# Patient Record
Sex: MALE | Race: WHITE | NOT HISPANIC OR LATINO | URBAN - METROPOLITAN AREA
[De-identification: names, ages, dates, MRNs, and addresses within clinical notes are randomized per-mention and may not be internally consistent; named-entity substitution may affect disease eponyms.]

---

## 2020-09-22 ENCOUNTER — OFFICE VISIT (OUTPATIENT)
Dept: INTERNAL MEDICINE | Facility: CLINIC | Age: 35
End: 2020-09-22

## 2020-09-22 VITALS
DIASTOLIC BLOOD PRESSURE: 78 MMHG | BODY MASS INDEX: 32.05 KG/M2 | HEIGHT: 72 IN | HEART RATE: 84 BPM | OXYGEN SATURATION: 97 % | WEIGHT: 236.6 LBS | SYSTOLIC BLOOD PRESSURE: 114 MMHG

## 2020-09-22 DIAGNOSIS — Z11.1 TUBERCULOSIS SCREENING: ICD-10-CM

## 2020-09-22 DIAGNOSIS — Z00.00 ANNUAL PHYSICAL EXAM: Primary | ICD-10-CM

## 2020-09-22 DIAGNOSIS — Z23 NEEDS FLU SHOT: ICD-10-CM

## 2020-09-22 DIAGNOSIS — Z13.220 LIPID SCREENING: ICD-10-CM

## 2020-09-22 DIAGNOSIS — E66.9 OBESITY, CLASS I, BMI 30-34.9: ICD-10-CM

## 2020-09-22 PROCEDURE — 90471 IMMUNIZATION ADMIN: CPT | Performed by: FAMILY MEDICINE

## 2020-09-22 PROCEDURE — 99385 PREV VISIT NEW AGE 18-39: CPT | Performed by: FAMILY MEDICINE

## 2020-09-22 PROCEDURE — 90686 IIV4 VACC NO PRSV 0.5 ML IM: CPT | Performed by: FAMILY MEDICINE

## 2020-09-22 NOTE — PROGRESS NOTES
"Date of Encounter: 2020  Patient: Nakul Torres,  1985    Subjective   History of Presenting Illness  Chief complaint: Annual physical    No acute concerns.    Entering health leadership program at [employer].  Needs tuberculosis screening as part of his evaluation for the program.    Lives with wife and son.  Sexually active.  Prior very brief smoker.  3-4 alcoholic drinks per week.  Exercises 5 to 7 days/week using a exercise bike.  Has a dietitian to help make meals.  Works in commercial real estate finance.    Review of Systems:  Negative for fever, congestion, chest pain upon exertion, shortness of breath, vision changes, vomiting, dysuria, lymphadenopathy, muscle weakness, numbness, mood changes, rashes.    The following portions of the patient's history were reviewed and updated as appropriate: allergies, current medications, past family history, past medical history, past social history, past surgical history and problem list.    Patient Active Problem List   Diagnosis   • Obesity, Class I, BMI 30-34.9     History reviewed. No pertinent past medical history.  History reviewed. No pertinent surgical history.  Family History   Problem Relation Age of Onset   • Diabetes Mother        Current Outpatient Medications:   •  Multiple Vitamins-Minerals (MULTIVITAMIN ADULT PO), Take  by mouth., Disp: , Rfl:   No Known Allergies  Social History     Tobacco Use   • Smoking status: Former Smoker   • Smokeless tobacco: Never Used   Substance Use Topics   • Alcohol use: Yes   • Drug use: Never          Objective   Physical Exam  Vitals:    20 0838   BP: 114/78   Pulse: 84   SpO2: 97%   Weight: 107 kg (236 lb 9.6 oz)   Height: 182.9 cm (72\")     Body mass index is 32.09 kg/m².    Constitutional: NAD.  Eyes: EOMI. PERRLA. Normal conjunctiva.  Ear, nose, mouth, throat: No tonsillar exudates or erythema.   Normal nasal mucosa. Normal external ear canals and TMs bilaterally.  Cardiovascular: RRR. No " murmurs. No LE edema b/l. Radial pulses 2+ bilaterally.  Pulmonary: CTA b/l. Good effort.  Integumentary: No rashes or wounds on face or upper extremities.  Lymphatic: No anterior cervical lymphadenopathy.  Endocrine: No thyromegaly or palpable thyroid nodules.  Psychiatric: Normal affect. Normal thought content.     Assessment/Plan   Assessment and Plan  Pleasant 35-year-old male with BMI greater than 30 who presents for the following:    Diagnoses and all orders for this visit:    1. Annual physical exam (Primary): We discussed preventative care including age and patient-appropriate immunizations and cancer screening. We also discussed the importance of exercise and a healthy diet. This is their annual preventative exam.    2. Tuberculosis screening  -     QuantiFERON TB Gold    3. Lipid screening  -     Lipid Panel    4. Needs flu shot  -     FluLaval Quad >6 Months (1977-8516)    5. Obesity, Class I, BMI 30-34.9    Return to office in 1 year for annual physical or earlier as needed.    Nikita Guillen MD  Family Medicine  O: [phone]  C: [phone]    Disclaimer: Parts of this note were dictated by speech recognition. Minor errors in transcription may be present. Please call if questions.

## 2020-09-25 PROBLEM — E78.5 HYPERLIPIDEMIA: Status: ACTIVE | Noted: 2020-09-25

## 2020-09-25 LAB
CHOLEST SERPL-MCNC: 142 MG/DL (ref 100–199)
GAMMA INTERFERON BACKGROUND BLD IA-ACNC: 0.04 IU/ML
HDLC SERPL-MCNC: 19 MG/DL
LDLC SERPL CALC-MCNC: 103 MG/DL (ref 0–99)
M TB IFN-G BLD-IMP: NEGATIVE
M TB IFN-G CD4+ BCKGRND COR BLD-ACNC: 0.05 IU/ML
M TB IFN-G CD4+CD8+ BCKGRND COR BLD-ACNC: 0.05 IU/ML
MITOGEN IGNF BLD-ACNC: >10 IU/ML
QUANTIFERON INCUBATION: NORMAL
SERVICE CMNT-IMP: NORMAL
TRIGL SERPL-MCNC: 108 MG/DL (ref 0–149)
VLDLC SERPL CALC-MCNC: 20 MG/DL (ref 5–40)

## 2020-10-27 ENCOUNTER — TELEPHONE (OUTPATIENT)
Dept: INTERNAL MEDICINE | Facility: CLINIC | Age: 35
End: 2020-10-27

## 2020-10-27 NOTE — TELEPHONE ENCOUNTER
Patient called to see if we had records of his MMR vaccination prior to 11/21/2019.  As this was completed in [place], we do not.  Pt is wanting to come in for an additional vaccine.  Explained that we would need to see if he has had the full vaccine before administering any additional through blood work.  Patient needs this for school/college.  Please advise.    Pt call back [phone]

## 2020-10-28 NOTE — TELEPHONE ENCOUNTER
Let him know we have two options:  #1 he gets records of prior vaccination, and therefore we don't need to administer any more  #2 we get titers for MMR, and if they are positive he does not need any further vaccination    Regardless I think we will need to have him get the vaccine at a pharmacy when needed as we only have MMRV (proquad) I think?

## 2020-10-28 NOTE — TELEPHONE ENCOUNTER
S/w pt re: retrieving records from [place]. I advised pt to contact previous PCP and ask for records, specifically vaccination records. I also offered to have pt come in to our office to sign release form that I will then fax to the [place] office for records.     Pt states he would like to do both, contact [place] office and sign form for our office. Pt will come in to office tomorrow, 10/29/2020, to sign release form.    I also advised pt that if we are unable to confirm previous vaccination for MMR, we can have him come in for MMR titer instead to determine if he needs to be vaccinated at all. Pt agreed.    Side note: we do only have ProQuad, and I will advise pt if titers come back negative.

## 2020-11-13 ENCOUNTER — TELEPHONE (OUTPATIENT)
Dept: INTERNAL MEDICINE | Facility: CLINIC | Age: 35
End: 2020-11-13

## 2020-11-13 NOTE — TELEPHONE ENCOUNTER
Patient called in and stated he has requested medical records from [place] and wants to know were they are in receiving this and the process .         Please call him at [phone]

## 2020-11-16 NOTE — TELEPHONE ENCOUNTER
Spoke to patient in person and advised we have made several attempts to obtain records and have been unsuccessful.  Advised patient that he may call his previous pcp and request a copy as he is entitled to one free copy.  Pt advised he will do this.

## 2020-12-01 DIAGNOSIS — Z23 NEED FOR MEASLES-MUMPS-RUBELLA (MMR) VACCINE: Primary | ICD-10-CM

## 2022-07-25 ENCOUNTER — OFFICE VISIT (OUTPATIENT)
Dept: INTERNAL MEDICINE | Facility: CLINIC | Age: 37
End: 2022-07-25

## 2022-07-25 VITALS
DIASTOLIC BLOOD PRESSURE: 92 MMHG | HEART RATE: 95 BPM | OXYGEN SATURATION: 95 % | BODY MASS INDEX: 32.77 KG/M2 | SYSTOLIC BLOOD PRESSURE: 140 MMHG | WEIGHT: 241.6 LBS

## 2022-07-25 DIAGNOSIS — F33.1 MAJOR DEPRESSIVE DISORDER, RECURRENT, MODERATE: Primary | ICD-10-CM

## 2022-07-25 PROCEDURE — 99214 OFFICE O/P EST MOD 30 MIN: CPT | Performed by: FAMILY MEDICINE

## 2022-07-25 RX ORDER — ESCITALOPRAM OXALATE 10 MG/1
TABLET ORAL
Qty: 60 TABLET | Refills: 1 | Status: SHIPPED | OUTPATIENT
Start: 2022-07-25 | End: 2022-10-19 | Stop reason: SDUPTHER

## 2022-07-25 NOTE — PROGRESS NOTES
Date of Encounter: 2022  Patient: Nakul Torres,  1985    Subjective   History of Presenting Illness  Chief complaint: Depression    Patient presents with concerns for symptoms of depression as identified by himself and his wife.  He has had a lot of stressors recently including working a job in [place] while his wife works a job in [place], with their primary place of residence being [place].  They also having some difficulties with fertility.  His grandmother was just put in a nursing home.  Because of this he has found himself more irritable, having angry outburst, difficulty with sleep, negative thoughts, depressed mood, drinking 2-3 bourbons 4 times per week, using CBD as well to cope.  He has had some suicidal thoughts but has not acted on these and does not have a plan, he would talk to his wife if these feelings were strong.  He has been on a depression medication before due to an episode several years ago related to a loss in the family, he thinks it was S-Citalopram after reviewing some medications verbally and he tolerated this well.  PHQ-9 equals 10.    Review of Systems:  Negative for fever, cough, shortness of breath    The following portions of the patient's history were reviewed and updated as appropriate: allergies, current medications, past family history, past medical history, past social history, past surgical history and problem list.    Patient Active Problem List   Diagnosis   • Obesity, Class I, BMI 30-34.9   • Hyperlipidemia   • Major depressive disorder, recurrent, moderate (HCC)     History reviewed. No pertinent past medical history.  No past surgical history on file.  Family History   Problem Relation Age of Onset   • Diabetes Mother        Current Outpatient Medications:   •  Multiple Vitamins-Minerals (MULTIVITAMIN ADULT PO), Take  by mouth., Disp: , Rfl:   •  escitalopram (LEXAPRO) 10 MG tablet, Take 1 tab PO QD for mood, Disp: 60 tablet, Rfl: 1  No  Known Allergies  Social History     Tobacco Use   • Smoking status: Former Smoker   • Smokeless tobacco: Never Used   Substance Use Topics   • Alcohol use: Yes   • Drug use: Never          Objective   Physical Exam  Vitals:    07/25/22 0855   BP: 140/92   Pulse: 95   SpO2: 95%   Weight: 110 kg (241 lb 9.6 oz)     Body mass index is 32.77 kg/m².    Constitutional: NAD.  Psychiatric: Depressed mood and affect. Normal thought content.  Good insight and judgment.  Normal thought content.  Good eye contact.  No SI or HI.     Assessment & Plan   Assessment and Plan  Pleasant 37-year-old male with hyperlipidemia, BMI greater than 30, who presents with the following:    Diagnoses and all orders for this visit:    1. Major depressive disorder, recurrent, moderate (HCC) (Primary)  -     escitalopram (LEXAPRO) 10 MG tablet; Take 1 tab PO QD for mood  Dispense: 60 tablet; Refill: 1    Based on his symptoms and external stressors I do think the benefits outweigh the risks of SSRIs.  We discussed the risks and benefits of these medications at length.  He will have to be mindful for any sort of sexual dysfunction due to their fertility issues.  He plans to establish with a therapist in [place] which I think is a good idea.  He will continue regular exercise.  Discussed follow-up in a few weeks but he would like to follow-up as needed.  Discussed typical duration of at least 6 months of this medication.    Nikita Guillen MD  Family Medicine  O: [phone]  C: [phone]    Disclaimer: Parts of this note were dictated by speech recognition. Minor errors in transcription may be present. Please call if questions.

## 2022-10-19 DIAGNOSIS — F33.1 MAJOR DEPRESSIVE DISORDER, RECURRENT, MODERATE: ICD-10-CM

## 2022-10-19 RX ORDER — ESCITALOPRAM OXALATE 10 MG/1
TABLET ORAL
Qty: 60 TABLET | Refills: 1 | Status: SHIPPED | OUTPATIENT
Start: 2022-10-19 | End: 2023-01-16

## 2023-01-15 DIAGNOSIS — F33.1 MAJOR DEPRESSIVE DISORDER, RECURRENT, MODERATE: ICD-10-CM

## 2023-01-16 RX ORDER — ESCITALOPRAM OXALATE 10 MG/1
TABLET ORAL
Qty: 90 TABLET | Refills: 1 | Status: SHIPPED | OUTPATIENT
Start: 2023-01-16 | End: 2023-01-26 | Stop reason: SDUPTHER

## 2023-01-25 ENCOUNTER — TELEPHONE (OUTPATIENT)
Dept: INTERNAL MEDICINE | Facility: CLINIC | Age: 38
End: 2023-01-25

## 2023-01-25 DIAGNOSIS — F33.1 MAJOR DEPRESSIVE DISORDER, RECURRENT, MODERATE: ICD-10-CM

## 2023-01-25 RX ORDER — ESCITALOPRAM OXALATE 10 MG/1
TABLET ORAL
Qty: 90 TABLET | Refills: 1 | Status: CANCELLED | OUTPATIENT
Start: 2023-01-25

## 2023-01-25 NOTE — TELEPHONE ENCOUNTER
Caller: Nakul Torres    Relationship: Self    Best call back number: [phone]    Requested Prescriptions:   Requested Prescriptions     Pending Prescriptions Disp Refills   • escitalopram (LEXAPRO) 10 MG tablet 90 tablet 1     Sig: TAKE 1 TABLET BY MOUTH EVERY DAY FOR MOOD        Pharmacy where request should be sent: [id number] - [place], [place] - [address]  - [phone]  - [phone] FX     Additional details provided by patient: PATIENT COMPLETELY OUT    Does the patient have less than a 3 day supply:  [x] Yes  [] No    Would you like a call back once the refill request has been completed: [x] Yes [] No    If the office needs to give you a call back, can they leave a voicemail: [x] Yes [] No    Jessica Gann Rep   01/25/23 10:06 EST

## 2023-01-26 ENCOUNTER — TELEPHONE (OUTPATIENT)
Dept: INTERNAL MEDICINE | Facility: CLINIC | Age: 38
End: 2023-01-26
Payer: COMMERCIAL

## 2023-01-26 DIAGNOSIS — F33.1 MAJOR DEPRESSIVE DISORDER, RECURRENT, MODERATE: ICD-10-CM

## 2023-01-26 RX ORDER — ESCITALOPRAM OXALATE 10 MG/1
TABLET ORAL
Qty: 90 TABLET | Refills: 3 | Status: SHIPPED | OUTPATIENT
Start: 2023-01-26 | End: 2023-04-04 | Stop reason: SDUPTHER

## 2023-01-26 NOTE — TELEPHONE ENCOUNTER
Caller: Nakul Torres    Relationship: Self    Best call back number: [phone]    What is the best time to reach you: ANY    Who are you requesting to speak with (clinical staff, provider,  specific staff member): CLINICAL      What was the call regarding: PATIENT IS JUST CALLING TO CHECK ON HIS REFILL REQUEST FOR THE LEXAPRO    Do you require a callback: YES

## 2023-01-26 NOTE — TELEPHONE ENCOUNTER
Rx Refill Note  Requested Prescriptions     Pending Prescriptions Disp Refills   • escitalopram (LEXAPRO) 10 MG tablet 90 tablet 1     Sig: TAKE 1 TABLET BY MOUTH EVERY DAY FOR MOOD      Last office visit with prescribing clinician: 7/25/2022   Last telemedicine visit with prescribing clinician: Visit date not found   Next office visit with prescribing clinician: Visit date not found                         Would you like a call back once the refill request has been completed: [] Yes [] No    If the office needs to give you a call back, can they leave a voicemail: [] Yes [] No    Nicolette Morrison LPN  01/26/23, 09:18 EST

## 2023-04-04 DIAGNOSIS — F33.1 MAJOR DEPRESSIVE DISORDER, RECURRENT, MODERATE: ICD-10-CM

## 2023-04-04 RX ORDER — ESCITALOPRAM OXALATE 10 MG/1
TABLET ORAL
Qty: 90 TABLET | Refills: 3 | Status: SHIPPED | OUTPATIENT
Start: 2023-04-04

## 2023-04-04 NOTE — TELEPHONE ENCOUNTER
Caller: RodyyulissalindseycoyNakul    Relationship: Self    Best call back number: [phone]    Requested Prescriptions:   Requested Prescriptions     Pending Prescriptions Disp Refills   • escitalopram (LEXAPRO) 10 MG tablet 90 tablet 3     Sig: TAKE 1 TABLET BY MOUTH EVERY DAY FOR MOOD        Pharmacy where request should be sent: [id number] - [place], [place] - [address]  [address] - [phone]  - [phone] FX     Last office visit with prescribing clinician: 7/25/2022   Last telemedicine visit with prescribing clinician: Visit date not found   Next office visit with prescribing clinician: Visit date not found     Additional details provided by patient: TOOK LAST DOSE TODAY     Does the patient have less than a 3 day supply:  [x] Yes  [] No    Would you like a call back once the refill request has been completed: [] Yes [] No    If the office needs to give you a call back, can they leave a voicemail: [] Yes [] No    Jessica Zheng Rep   04/04/23 08:14 EDT

## 2023-07-13 PROBLEM — F33.42 MAJOR DEPRESSION, RECURRENT, FULL REMISSION: Status: ACTIVE | Noted: 2022-07-25

## 2023-07-26 ENCOUNTER — TELEPHONE (OUTPATIENT)
Dept: INTERNAL MEDICINE | Facility: CLINIC | Age: 38
End: 2023-07-26
Payer: COMMERCIAL

## 2023-07-26 DIAGNOSIS — M54.16 ACUTE LEFT LUMBAR RADICULOPATHY: Primary | ICD-10-CM

## 2023-07-26 NOTE — TELEPHONE ENCOUNTER
Good to hear aspects are improving. As long as he is not having weakness, I think a few sessions of physical therapy would be sufficient. I will send to [facility] unless he has alternative preference. Continue to monitor symptoms and let me know if worsening or not improving with this treatment.

## 2023-07-26 NOTE — TELEPHONE ENCOUNTER
Pt calling due to still having numbness in left leg. Pt was in office last week for that plus back pain. Pt back pain has improved significantly- now he's just uncomfortable- but he is STILL having numbness down the leg.    Please advise next steps

## 2023-07-26 NOTE — TELEPHONE ENCOUNTER
LVM for pt with providers message, advised to call back with any questions/ if he had a preference of physical therapy facility.

## 2023-08-11 ENCOUNTER — TREATMENT (OUTPATIENT)
Dept: PHYSICAL THERAPY | Facility: CLINIC | Age: 38
End: 2023-08-11
Payer: COMMERCIAL

## 2023-08-11 DIAGNOSIS — M54.16 RADICULOPATHY, LUMBAR REGION: Primary | ICD-10-CM

## 2023-08-11 DIAGNOSIS — Z74.09 IMPAIRED FUNCTIONAL MOBILITY AND ACTIVITY TOLERANCE: ICD-10-CM

## 2023-08-11 DIAGNOSIS — S39.012D STRAIN OF LUMBAR REGION, SUBSEQUENT ENCOUNTER: ICD-10-CM

## 2023-08-11 NOTE — PROGRESS NOTES
Physical Therapy Initial Evaluation and Plan of Care  [facility]  [address]  [place] [address]    Patient: Nakul Torres   : 1985  Diagnosis/ICD-10 Code:  Radiculopathy, lumbar region [M54.16]  Referring practitioner: Nikita Guillen MD  Today's Date: 2023    Subjective Evaluation    History of Present Illness  Mechanism of injury: Playing golf 4-5 weeks ago - trying to get more torque from his swing and had sharp pain in the lower back and left anterior thigh  Much better after steroid pack - residual numbness in the left anterior thigh  Lift weights, plays golf, yoga, runs on [place] treadmill daily       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  Pain  Current pain ratin  At best pain ratin  At worst pain ratin  Location: numbness tiingling in left anterior thigh, previous left LBP - resolved  Quality: needle-like and burning  Relieving factors: rest, medications, relaxation and change in position (standing)  Aggravating factors: lifting (sitting)    Diagnostic Tests  No diagnostic tests performed    Treatments  Previous treatment: medication  Current treatment: medication and physical therapy  Patient Goals  Patient goal: resolve the LE symptoms         Objective        Special Questions      Additional Special Questions  Negative responses to lumbar special questions       Postural Observations    Additional Postural Observation Details  Level pelvis    Tenderness     Left Hip   No tenderness in the PSIS.     Neurological Testing     Sensation     Lumbar   Left   Diminished: light touch    Comments   Left light touch: Left anterior and laterlsa thigh    Active Range of Motion     Lumbar   Flexion: 75 degrees   Extension: 50 degrees   Left lateral flexion: 75 degrees   Right lateral flexion: 75 degrees   Left rotation: 75 degrees   Right rotation: 75 degrees     Additional Active Range of  Motion Details  Measurements are listed as percentages of expected motion, not degrees of motion       Strength/Myotome Testing     Lumbar     Right   Normal strength    Left Hip   Planes of Motion   Flexion: 4 (pain)    Left Ankle/Foot   Dorsiflexion: 4+  Great toe extension: 4+        Assessment & Plan     Assessment  Impairments: abnormal muscle firing, abnormal muscle tone, abnormal or restricted ROM, activity intolerance, impaired physical strength, lacks appropriate home exercise program and pain with function  Functional Limitations: carrying objects, lifting, walking, uncomfortable because of pain and stooping  Assessment details: 38 y.o. male who sustained a back injury while swinging a golf club presents with: 1. Minimal lower back pain at times, 2. Residual left anterior thigh dysthesias, 3. Mild weakness in left dorsiflexion, 4. Decreased spinal and LE flexibility, 5. Very active lifestyle requiring full function without pain, 6. Decreased tolerance for some of his work outs due to residual symptoms and fear of re-injury  Prognosis: good    Goals  Plan Goals: Short Term Goals: 3 weeks  Patient will be able to tolerate initial exercises  Patient will have pain <3/10  Patient will be able to walk for 30 minutes without increased symptoms  Patient will be able to lift 20# to waist without increased symptoms     Long Term Goals: 6 weeks  Patient will be independent in performing home exercise program.  Patient will have functional pain free spinal and LE AROM  Patient will be able to swing a golf club without increased symptoms   Patient will be able to return to his full workouts without increased symptoms         Plan  Therapy options: will be seen for skilled therapy services  Planned modality interventions: ultrasound  Planned therapy interventions: manual therapy, strengthening, stretching, therapeutic activities, joint mobilization and home exercise program  Frequency: 1x week  Duration in visits:  6  Duration in weeks: 6  Treatment plan discussed with: patient  Plan details: Access Code: [email or web address]  URL: [email or web address]  Date: 08/11/2023  Prepared by: Tena Riojas    Exercises  - Supine Single Knee to Chest Stretch  - 1 x daily - 7 x weekly - 1 sets - 2 reps - 20 seconds hold  - Supine Lower Trunk Rotation  - 1 x daily - 7 x weekly - 1 sets - 2 reps - 20 seconds hold  - Supine Piriformis Stretch with Foot on Ground  - 1 x daily - 7 x weekly - 1 sets - 2 reps - 20 seconds hold  - Supine Piriformis Stretch  - 1 x daily - 7 x weekly - 1 sets - 2 reps - 20 seconds hold  - Supine Hamstring Stretch with Strap  - 1 x daily - 7 x weekly - 1 sets - 2 reps - 20 seconds hold  - Supine ITB Stretch with Strap  - 1 x daily - 7 x weekly - 1 sets - 2 reps - 20 seconds hold  - Static Prone on Elbows  - 1 x daily - 7 x weekly - 1 sets - 1 reps - 5 minutes hold  - Backwards Walking on Treadmill  - 1 x daily - 7 x weekly - 1 sets - 1 reps - 5 minutes hold      Manual Therapy:    0     mins  90064;  Therapeutic Exercise:    25     mins  72991;     Neuromuscular Ingrid:    0    mins  66414;    Therapeutic Activity:     5     mins  83122;     Ultrasound                  __8_  mins  05607  Iontophoresis                 0    mins  88562    Electrical Stimulation     0    mins  11205 ([id number])  Traction                         _0  mins  56586     Evaluation Time:     20  mins  Timed Treatment:   38   mins   Total Treatment:     65   mins    PT: Tena Riojas PT     License Number: [id number]  Electronically signed by Tena Riojas PT, 08/11/23, 8:03 AM EDT    Certification Period: 8/11/2023 thru 11/8/2023  I certify that the therapy services are furnished while this patient is under my care.  The services outlined above are required by this patient, and will be reviewed every 90 days.         Physician Signature:__________________________________________________    PHYSICIAN: Nikita Guillen MD      DATE:     Please sign  and return via fax to [email or web address] . Thank you, [facility].    PT SIGNATURE: Tena Riojas, PT   [place] LICENSE:  [id number]

## 2023-08-18 ENCOUNTER — TREATMENT (OUTPATIENT)
Dept: PHYSICAL THERAPY | Facility: CLINIC | Age: 38
End: 2023-08-18
Payer: COMMERCIAL

## 2023-08-18 DIAGNOSIS — M54.16 RADICULOPATHY, LUMBAR REGION: Primary | ICD-10-CM

## 2023-08-18 DIAGNOSIS — S39.012D STRAIN OF LUMBAR REGION, SUBSEQUENT ENCOUNTER: ICD-10-CM

## 2023-08-18 DIAGNOSIS — Z74.09 IMPAIRED FUNCTIONAL MOBILITY AND ACTIVITY TOLERANCE: ICD-10-CM

## 2023-08-18 NOTE — PROGRESS NOTES
thPhysical Therapy Daily Treatment Note  [facility] [place], [place]  [address], [address]  [place] [address]    Patient: Nakul Torres   : 1985  Referring practitioner: Nikita Guillen MD  Today's Date: 2023  Patient seen for 2 sessions    Visit Diagnoses:    ICD-10-CM ICD-9-CM   1. Radiculopathy, lumbar region  M54.16 724.4   2. Strain of lumbar region, subsequent encounter  S39.012D V58.89     847.2   3. Impaired functional mobility and activity tolerance  Z74.09 V49.89       Subjective   Nakul Torres reports: that he is feeling better.  He is taking aleve bid now and thinks that it was helpful.  Was compliant with his home stretches.  Notes the left side is tighter than the right.  No radicular pain but still has anterior thigh numbness, but to a lesser degree than last session.  Negative Valsalva.       Objective   Spinal AROM - flexion 75%, extension 75%    Level pelvis     See Exercise, Manual, and Modality Logs for complete treatment.     Patient Education: reviewed current HEP    Assessment/Plan  Good technique with HEP.  Decreased pain and some increased flexibility.  Decreased intensity of anterior thigh pain. Will work on additional stabilization exercises    Progress strengthening /stabilization /functional activity           Timed:  Manual Therapy:    0     mins  41481;  Therapeutic Exercise:    15     mins  84619;     Neuromuscular Ingrid:    0    mins  19308;    Therapeutic Activity:     15     mins  70099;     Ultrasound:      8     mins  59601;    Iontophoresis              __0_   mins  Dry Needling               _____   mins        Untimed:  Electrical Stimulation:     0    mins  52204 ( );  Mechanical Traction:             mins  91936;   Paraffin                       _____  mins     Timed Treatment:   38   mins   Total Treatment:     45   mins    Tena Riojas, PT  [place] License # [id number]  Physical Therapist    Electronically signed by  Tena Riojas, PT, 08/18/23, 2:30 PM EDT

## 2023-08-25 ENCOUNTER — TREATMENT (OUTPATIENT)
Dept: PHYSICAL THERAPY | Facility: CLINIC | Age: 38
End: 2023-08-25
Payer: COMMERCIAL

## 2023-08-25 DIAGNOSIS — M54.16 RADICULOPATHY, LUMBAR REGION: Primary | ICD-10-CM

## 2023-08-25 DIAGNOSIS — S39.012D STRAIN OF LUMBAR REGION, SUBSEQUENT ENCOUNTER: ICD-10-CM

## 2023-08-25 DIAGNOSIS — Z74.09 IMPAIRED FUNCTIONAL MOBILITY AND ACTIVITY TOLERANCE: ICD-10-CM

## 2023-08-25 NOTE — PROGRESS NOTES
Physical Therapy Daily Treatment Note  [facility] Therapy [facility]  [address], [address]  [place] [address]    Patient: Nakul Torres   : 1985  Referring practitioner: Nikita Guillen MD  Today's Date: 2023  Patient seen for 3 sessions    Visit Diagnoses:    ICD-10-CM ICD-9-CM   1. Radiculopathy, lumbar region  M54.16 724.4   2. Strain of lumbar region, subsequent encounter  S39.012D V58.89     847.2   3. Impaired functional mobility and activity tolerance  Z74.09 V49.89       Subjective   Naukl Torres reports: that his back is much better.  Still has some numbness in left anterior thigh but the intensity of the numbness has decreased.  Feels like his flexibility is increasing. No back pain.  Was able to swing the golf club without pain.       Objective   Spinal AROM - functional and pain free in all planes of motion     Decreased tiingling with LAD LLE    Decreased tingling with ROBERT    See Exercise, Manual, and Modality Logs for complete treatment.     Patient Education: frequent position changes when needing to sit, use ROBERT position daily after work, progress to PPU if no pain    Assessment/Plan  Rodney has much less tightness and tingling in the lower back and left thigh.  He does get relief of parasthesias with LAD and ROBERT.  No pain upon palpation of the lumbar spine.  Will stop PT at this time per patient request due to schedules.  If symptoms worsen he will restart PT.    DC PT at this time           Timed:  Manual Therapy:    15     mins  00475;  Therapeutic Exercise:    5     mins  15959;     Neuromuscular Ingrid:    0    mins  17363;    Therapeutic Activity:     10     mins  18383;     Ultrasound:      0     mins  69454;    Iontophoresis              __0_   mins  Dry Needling               _____   mins        Untimed:  Electrical Stimulation:     0    mins  04458 ( );  Mechanical Traction:             mins  39917;   Paraffin                        _____  mins     Timed Treatment:   30   mins   Total Treatment:     35   mins    Tena Riojas, PT  [place] License # [id number]  Physical Therapist    Electronically signed by Tena Riojas, PT, 08/25/23, 2:39 PM EDT